# Patient Record
Sex: MALE | Race: WHITE | NOT HISPANIC OR LATINO | Employment: FULL TIME | URBAN - METROPOLITAN AREA
[De-identification: names, ages, dates, MRNs, and addresses within clinical notes are randomized per-mention and may not be internally consistent; named-entity substitution may affect disease eponyms.]

---

## 2017-04-17 ENCOUNTER — HOSPITAL ENCOUNTER (EMERGENCY)
Facility: HOSPITAL | Age: 22
Discharge: HOME | End: 2017-04-18
Attending: EMERGENCY MEDICINE
Payer: COMMERCIAL

## 2017-04-17 DIAGNOSIS — G89.29 CHRONIC RIGHT SHOULDER PAIN: Primary | ICD-10-CM

## 2017-04-17 DIAGNOSIS — G40.909 SEIZURE DISORDER: ICD-10-CM

## 2017-04-17 DIAGNOSIS — M25.511 CHRONIC RIGHT SHOULDER PAIN: Primary | ICD-10-CM

## 2017-04-17 DIAGNOSIS — M25.519 SHOULDER PAIN: ICD-10-CM

## 2017-04-17 PROCEDURE — 99283 EMERGENCY DEPT VISIT LOW MDM: CPT

## 2017-04-17 NOTE — ED AVS SNAPSHOT
[facility] - [address] [address]               Sharif Parker   2017 11:43 PM   ED    Description:  Male : 1995   Department:  [facility]           Your Care was Coordinated By:     Provider Role From To    Letty Rachel MD Attending Provider 17 0519 --      Reason for Visit     Shoulder Problem           Diagnoses this Visit        Comments    Chronic right shoulder pain    -  Primary     Shoulder pain         Seizure disorder           ED Disposition     None           To Do List           Follow-up Information     Follow up with [facility] NEUROLOGY. Schedule an appointment as soon as possible for a visit in 2 days.    Specialty:  Neurology    Contact information:    [address] [place] [address]  [phone]        Follow up with [facility] ORTHOPEDICS. Schedule an appointment as soon as possible for a visit in 2 days.    Specialty:  Orthopedics    Contact information:    [address] [place] [address]  [phone]       These Medications        Disp Refills Start End    naproxen (NAPROSYN) 500 MG tablet 30 tablet 0 2017     Take 1 tablet (500 mg total) by mouth 2 (two) times daily with meals. - Oral      [facility]     [facility] On Call Nurse Care Line -  Assistance  Unless otherwise directed by your provider, please contact [facility], our nurse care line that is available for  assistance.     Registered nurses in the [facility] provide: appointment scheduling, clinical advisement, health education, and other advisory services.  Call: [phone] (toll free)               Medications           Message regarding Medications     Verify the changes and/or additions to your medication regime listed below are the same as discussed with your clinician today.  If any of these changes or additions are incorrect, please notify  "your healthcare provider.        START taking these NEW medications        Refills    naproxen (NAPROSYN) 500 MG tablet 0    Sig: Take 1 tablet (500 mg total) by mouth 2 (two) times daily with meals.    Class: Print    Route: Oral           Verify that the below list of medications is an accurate representation of the medications you are currently taking.  If none reported, the list may be blank. If incorrect, please contact your healthcare provider. Carry this list with you in case of emergency.           Current Medications     naproxen (NAPROSYN) 500 MG tablet Take 1 tablet (500 mg total) by mouth 2 (two) times daily with meals.           Clinical Reference Information           Your Vitals Were     BP Pulse Temp Resp Height Weight    122/73 (BP Location: Left arm, Patient Position: Sitting) 105 98.2 °F (36.8 °C) (Oral) 20 5' 11" (1.803 m) 127 kg (280 lb)    SpO2 BMI             96% 39.05 kg/m2         Allergies as of 4/18/2017     No Known Allergies      Immunizations Administered on Date of Encounter - 4/18/2017     None      ED Micro, Lab, POCT     None      ED Imaging Orders     Start Ordered       Status Ordering Provider    04/17/17 2345 04/17/17 2344  X-Ray Shoulder Trauma Right  1 time imaging      Final result       Discharge References/Attachments     EPILEPSY, SELF-CARE FOR (ENGLISH)    SHOULDER PAIN, UNCERTAIN CAUSE (ENGLISH)    SHOULDER PROBLEMS (ENGLISH)      [facility] Sign-Up     Activating your [facility] account is as easy as 1-2-3!     1) Visit [email or web address], select Sign Up Now, enter this activation code and your date of birth, then select Next.  [email or web address]  Expires: 6/2/2017 12:08 AM      2) Create a username and password to use when you visit [facility] in the future and select a security question in case you lose your password and select Next.    3) Enter your e-mail address and click Sign Up!    Additional Information  If you have questions, please e-mail [email or web address] or call " [phone] to talk to our [facility] staff. Remember, [facility] is NOT to be used for urgent needs. For medical emergencies, dial 911.         Smoking Cessation     If you would like to quit smoking:   You may be eligible for free services if you are a [place] resident and started smoking cigarettes before September 1, 1988.  Call the Smoking Cessation Trust (SCT) toll free at (049) 397-0114 or (692) 544-1913.   Call 1-800-QUIT-NOW if you do not meet the above criteria.   Contact us via email: [email or web address]   View our website for more information: [email or web address]         [facility] -  complies with applicable Federal civil rights laws and does not discriminate on the basis of race, color, national origin, age, disability, or sex.        Language Assistance Services     ATTENTION: Language assistance services are available, free of charge. Please call [phone].      ATENCIÓN: Si habla español, tiene a london disposición servicios gratuitos de asistencia lingüística. Llame al [phone].     CHÚ Ý: N?u b?n nói Ti?ng Vi?t, có các d?ch v? h? tr? ngôn ng? mi?n phí dành cho b?n. G?i s? [phone].

## 2017-04-18 VITALS
DIASTOLIC BLOOD PRESSURE: 75 MMHG | TEMPERATURE: 98 F | HEART RATE: 88 BPM | WEIGHT: 280 LBS | HEIGHT: 71 IN | SYSTOLIC BLOOD PRESSURE: 120 MMHG | RESPIRATION RATE: 18 BRPM | OXYGEN SATURATION: 96 % | BODY MASS INDEX: 39.2 KG/M2

## 2017-04-18 RX ORDER — NAPROXEN 500 MG/1
500 TABLET ORAL 2 TIMES DAILY WITH MEALS
Qty: 30 TABLET | Refills: 0 | Status: SHIPPED | OUTPATIENT
Start: 2017-04-18 | End: 2019-03-29

## 2017-04-18 RX ORDER — LEVETIRACETAM 1000 MG/1
1500 TABLET ORAL 2 TIMES DAILY
COMMUNITY
End: 2019-03-29 | Stop reason: SDUPTHER

## 2017-04-18 NOTE — ED PROVIDER NOTES
SCRIBE #1 NOTE: I, Matt Duckworth/Bridget Rivera, am scribing for, and in the presence of, Letty Rachel MD. I have scribed the entire note.      History      Chief Complaint   Patient presents with    Shoulder Problem     Possible dislocation of right shoulder after witnessed seizure. Hx has epilectic seizures       Review of patient's allergies indicates:  No Known Allergies     HPI   HPI    4/17/2017, 11:59 PM   History obtained from the patient and girlfriend      History of Present Illness: Sharif Parker is a 21 y.o. male patient with a hx of seizures who presents to the Emergency Department for R shoulder pain which onset suddenly tonight after patient had a seizure while lying in bed. Girlfriend states she heard a popping sound from shoulder during the seizure. No fall or trauma. Patient reports his last seizure was in November 2016. Pain is are constant and moderate in severity. Pain is worse with ROM. No other mitigating or exacerbating factors reported. Patient is on 1500 mg Keppra BID, although pt does not remember taking his Keppra today. Patient denies HA, n/v/d, CP, SOB, head trauma, LOC, urinary and fecal incontinence, and all other sxs at this time. No prior Tx reported. No further complaints or concerns at this time.         Arrival mode: Personal vehicle    PCP: Giancarlo Marin MD       Past Medical History:  Past Medical History:   Diagnosis Date    Seizures        Past Surgical History:  History reviewed. No pertinent surgical history.      Family History:  History reviewed. No pertinent family history.    Social History:  Social History     Social History Main Topics    Smoking status: Never Smoker    Smokeless tobacco: Not on file    Alcohol use No    Drug use: No    Sexual activity: Not on file       ROS   Review of Systems   Constitutional: Negative for chills and fever.   HENT: Negative for sore throat and trouble swallowing.         (+) Tongue and lip biting.   Respiratory:  Negative for cough and shortness of breath.    Cardiovascular: Negative for chest pain.   Gastrointestinal: Negative for abdominal pain, diarrhea, nausea and vomiting.   Genitourinary: Negative for dysuria and hematuria.   Musculoskeletal: Negative for back pain.        (+) R shoulder pain   Skin: Negative for rash and wound.   Neurological: Positive for seizures. Negative for dizziness, weakness, numbness and headaches.   Hematological: Does not bruise/bleed easily.   All other systems reviewed and are negative.      Physical Exam    Initial Vitals   BP Pulse Resp Temp SpO2   04/17/17 2340 04/17/17 2340 04/17/17 2340 04/17/17 2340 04/17/17 2340   122/73 105 20 98.2 °F (36.8 °C) 96 %      Physical Exam  Nursing Notes and Vital Signs Reviewed.  Constitutional: Patient is in no acute distress. Awake and alert. Well-developed and well-nourished.  Head: Atraumatic. Normocephalic.  Eyes: PERRL. EOM intact. Conjunctivae are not pale. No scleral icterus.  ENT: Mucous membranes are moist. Oropharynx is clear and symmetric. Positive abrasions to the lip and tongue.    Neck: Supple. Full ROM. No lymphadenopathy.  Cardiovascular: Regular rate. Regular rhythm. No murmurs, rubs, or gallops. Distal pulses are 2+ and symmetric.  Pulmonary/Chest: No respiratory distress. Clear to auscultation bilaterally. No wheezing, rales, or rhonchi.  Abdominal: Soft and non-distended.  There is no tenderness.  No rebound, guarding, or rigidity. Good bowel sounds.  Musculoskeletal: Moves all extremities.  No obvious deformities. No edema. No calf tenderness.  RUE: has no evident deformity. negative for swelling. Positive for tenderness. Pain with elevation of arm. Cap refill distally is <2 seconds. Radial pulses are equal and 2+ bilaterally. No motor deficit. No distal sensory deficit.  Skin: Warm and dry.  Neurological:  Alert, awake, and appropriate.  Normal speech. CN grossly intact. No acute focal neurological deficits are  "appreciated.  Psychiatric: Normal affect. Good eye contact. Appropriate in content.       ED Course    Procedures  ED Vital Signs:  Vitals:    04/17/17 2340   BP: 122/73   Pulse: 105   Resp: 20   Temp: 98.2 °F (36.8 °C)   TempSrc: Oral   SpO2: 96%   Weight: 127 kg (280 lb)   Height: 5' 11" (1.803 m)              The Emergency Provider reviewed the vital signs and test results, which are outlined above.    ED Discussion       12:15 AM: Discussed with pt all pertinent ED information and results. Discussed pt dx and plan of tx. Gave pt all f/u and return to the ED instructions. All questions and concerns were addressed at this time. Pt expresses understanding of information and instructions, and is comfortable with plan to discharge. Pt is stable for discharge.        Patient presents with seizure or seizure-like symptoms. I have no suspicion of an intracranial, traumatic, infectious, metabolic, toxic, cardiovascular (specifically arrhythmia), or other emergent medical condition requiring further intervention. Seizure precautions were discussed with the patient and/or caretaker, specifically not to swim unattended, not to operate motor vehicles or other machinery, and to avoid heights or other areas where falls may occur until cleared by primary care physician. Patient is safe for discharge.      ED Medication(s):  Medications - No data to display    New Prescriptions    NAPROXEN (NAPROSYN) 500 MG TABLET    Take 1 tablet (500 mg total) by mouth 2 (two) times daily with meals.       Follow-up Information     Follow up with [facility] NEUROLOGY. Schedule an appointment as soon as possible for a visit in 2 days.    Specialty:  Neurology    Contact information:    [address] [place] [id number] [phone]        Follow up with [facility] ORTHOPEDICS. Schedule an appointment as soon as possible for a visit in 2 days.    Specialty:  Orthopedics    Contact information:    [address] [place] [address] " [facility] [address]  [phone]            Medical Decision Making    Medical Decision Making:   History:   Old Medical Records: I decided to obtain old medical records.  Clinical Tests:   Radiological Study: Ordered and Reviewed           Scribe Attestation:   Scribe #1: I performed the above scribed service and the documentation accurately describes the services I performed. I attest to the accuracy of the note.    Attending:   Physician Attestation Statement for Scribe #1: I, Letty Rachel MD, personally performed the services described in this documentation, as scribed by Matt Duckworth/Bridget Rivera, in my presence, and it is both accurate and complete.          Clinical Impression       ICD-10-CM ICD-9-CM   1. Chronic right shoulder pain M25.511 719.41    G89.29 338.29   2. Shoulder pain M25.519 719.41   3. Seizure disorder G40.909 345.90       Disposition:   Disposition: Discharged  Condition: Stable         Letty Rachel MD  04/18/17 0032

## 2019-03-29 ENCOUNTER — HOSPITAL ENCOUNTER (EMERGENCY)
Facility: HOSPITAL | Age: 24
Discharge: HOME | End: 2019-03-29
Attending: EMERGENCY MEDICINE
Payer: COMMERCIAL

## 2019-03-29 VITALS
BODY MASS INDEX: 37.48 KG/M2 | RESPIRATION RATE: 16 BRPM | OXYGEN SATURATION: 98 % | DIASTOLIC BLOOD PRESSURE: 64 MMHG | HEART RATE: 77 BPM | TEMPERATURE: 98 F | SYSTOLIC BLOOD PRESSURE: 119 MMHG | WEIGHT: 261.81 LBS | HEIGHT: 70 IN

## 2019-03-29 DIAGNOSIS — G40.909 SEIZURE DISORDER: Primary | ICD-10-CM

## 2019-03-29 PROCEDURE — 99284 EMERGENCY DEPT VISIT MOD MDM: CPT

## 2019-03-29 PROCEDURE — 25000003 PHARM REV CODE 250: Performed by: EMERGENCY MEDICINE

## 2019-03-29 RX ORDER — LEVETIRACETAM 750 MG/1
1500 TABLET ORAL 2 TIMES DAILY
Qty: 180 TABLET | Refills: 3 | Status: SHIPPED | OUTPATIENT
Start: 2019-03-29

## 2019-03-29 RX ORDER — LEVETIRACETAM 500 MG/1
1500 TABLET ORAL
Status: COMPLETED | OUTPATIENT
Start: 2019-03-29 | End: 2019-03-29

## 2019-03-29 RX ADMIN — LEVETIRACETAM 1500 MG: 500 TABLET ORAL at 02:03

## 2019-03-29 NOTE — ED NOTES
Pt reports he has been out of his seizure meds for about a week and thinks he had a seizure while he was sleeping. Pt currently aaox4, in no acute distress with c/o muscle pain.

## 2019-03-29 NOTE — ED PROVIDER NOTES
SCRIBE #1 NOTE: I, Nicole Torres, am scribing for, and in the presence of, April Perez MD. I have scribed the entire note.         History     Chief Complaint   Patient presents with    Seizures     pt reports leg stiffness, weakness; out of seizure meds for a week       Review of patient's allergies indicates:  No Known Allergies      History of Present Illness   HPI    3/29/2019, 1:48 AM  History obtained from the patient      History of Present Illness: Sharif Parker is a 23 y.o. male patient with PMHx of seizures who presents to the Emergency Department for seizure which onset gradually a few hours ago while sleeping. Patient states he bit his tongue during episode. Symptoms are episodic and moderate in severity. No mitigating or exacerbating factors reported. No other sxs reported. Patient denies any fever, chills, HA, dizziness, head injury, bowel/bladder incontinence, N/V, extremity weakness/numbness, and all other sxs at this time. Patient states he has been out of his 1500mg bid Keppra and 400mg qd Aptiom for a week. No further complaints or concerns at this time.     Arrival mode: Personal vehicle      PCP: Giancarlo Marin MD        Past Medical History:  Past Medical History:   Diagnosis Date    Seizures        Past Surgical History:  History reviewed. No pertinent surgical history.      Family History:  History reviewed. No pertinent family history.    Social History:  Social History     Tobacco Use    Smoking status: Never Smoker   Substance and Sexual Activity    Alcohol use: No     Comment: occasionally    Drug use: No    Sexual activity: Yes        Review of Systems   Review of Systems   Constitutional: Negative for chills and fever.   HENT: Negative for sore throat.         (+) tongue biting   Respiratory: Negative for shortness of breath.    Cardiovascular: Negative for chest pain.   Gastrointestinal: Negative for nausea and vomiting.   Genitourinary: Negative for dysuria.   Musculoskeletal:  "Negative for back pain.   Skin: Negative for rash.   Neurological: Positive for seizures. Negative for dizziness, weakness, numbness and headaches.        (-) bowel/bladder incontinence   Hematological: Does not bruise/bleed easily.   All other systems reviewed and are negative.       Physical Exam     Initial Vitals [03/29/19 0026]   BP Pulse Resp Temp SpO2   (!) 175/81 106 20 98.2 °F (36.8 °C) 95 %      MAP       --          Physical Exam  Nursing Notes and Vital Signs Reviewed.  Constitutional: Patient is in no acute distress. Well-developed and well-nourished.  Head: Atraumatic. Normocephalic.  Eyes: PERRL. EOM intact. Conjunctivae are not pale. No scleral icterus.  ENT: Mucous membranes are moist. Oropharynx is clear and symmetric. Abrasion to R lateral tongue.  Neck: Supple. Full ROM. No lymphadenopathy.  Cardiovascular: Regular rate. Regular rhythm. No murmurs, rubs, or gallops. Distal pulses are 2+ and symmetric.  Pulmonary/Chest: No respiratory distress. Clear to auscultation bilaterally. No wheezing or rales.  Abdominal: Soft and non-distended.  There is no tenderness.  No rebound, guarding, or rigidity. Good bowel sounds.  Genitourinary: No CVA tenderness  Musculoskeletal: Moves all extremities. No obvious deformities. No edema. No calf tenderness.  Skin: Warm and dry.  Neurological:  Alert, awake, and appropriate.  Normal speech.  No acute focal neurological deficits are appreciated.  Psychiatric: Normal affect. Good eye contact. Appropriate in content.       ED Course   Procedures  ED Vital Signs:  Vitals:    03/29/19 0026 03/29/19 0235 03/29/19 0304   BP: (!) 175/81 124/67 119/64   Pulse: 106 86 77   Resp: 20 16 16   Temp: 98.2 °F (36.8 °C) 98 °F (36.7 °C) 98 °F (36.7 °C)   TempSrc: Oral Oral Oral   SpO2: 95% 99% 98%   Weight: 118.8 kg (261 lb 12.7 oz)     Height: 5' 10" (1.778 m)                   The Emergency Provider reviewed the vital signs and test results, which are outlined above.     ED " Discussion     2:53 AM: Reassessed pt at this time. Family at bedside. Discussed with pt all pertinent ED information. Discussed pt dx and plan of tx. Gave pt all f/u and return to the ED instructions. All questions and concerns were addressed at this time. Pt expresses understanding of information and instructions, and is comfortable with plan to discharge. Pt is stable for discharge.    Patient presents with seizure or seizure-like symptoms. I have no suspicion of an intracranial, traumatic, infectious, metabolic, toxic, cardiovascular (specifically arrhythmia), or other emergent medical condition requiring further intervention. Seizure precautions were discussed with the patient and/or caretaker, specifically not to swim unattended, not to operate motor vehicles or other machinery, and to avoid heights or other areas where falls may occur until cleared by primary care physician. Patient is safe for discharge.          ED Medication(s):  Medications   levETIRAcetam tablet 1,500 mg (1,500 mg Oral Given 3/29/19 0207)     Current Discharge Medication List   eslicarbazepine (APTIOM) 400 mg Tab 90 tablet 3 3/29/2019  No   Sig - Route: Take 1 tablet (400 mg total) by mouth once daily. - Oral     levETIRAcetam (KEPPRA) 750 MG Tab 180 tablet 3 3/29/2019  No   Sig - Route: Take 2 tablets (1,500 mg total) by mouth 2 (two) times daily. - Oral            Follow-up Information     Giancarlo Marin MD In 3 days.    Specialty:  Internal Medicine  Contact information:  [address] [address] [facility] [address]  [facility] PED[facility]ATR[facility] [address] [phone]             [facility] .    Specialty:  Emergency Medicine  Why:  As needed, If symptoms worsen  Contact information:  [address] [facility] [address] [phone]                      Medical Decision Making                 Scribe Attestation:   Scribe #1: I performed the above scribed service and the  documentation accurately describes the services I performed. I attest to the accuracy of the note.     Attending:   Physician Attestation Statement for Scribe #1: I, April Perez MD, personally performed the services described in this documentation, as scribed by Nicole Torres, in my presence, and it is both accurate and complete.           Clinical Impression       ICD-10-CM ICD-9-CM   1. Seizure disorder G40.909 345.90       Disposition:   Disposition: Discharged  Condition: Stable         April Perez MD  03/29/19 0583

## 2024-01-07 ENCOUNTER — OFFICE VISIT (OUTPATIENT)
Dept: URGENT CARE | Facility: CLINIC | Age: 29
End: 2024-01-07
Payer: COMMERCIAL

## 2024-01-07 VITALS
OXYGEN SATURATION: 96 % | BODY MASS INDEX: 37.37 KG/M2 | WEIGHT: 261 LBS | SYSTOLIC BLOOD PRESSURE: 145 MMHG | HEIGHT: 70 IN | RESPIRATION RATE: 18 BRPM | DIASTOLIC BLOOD PRESSURE: 98 MMHG | HEART RATE: 97 BPM | TEMPERATURE: 98 F

## 2024-01-07 DIAGNOSIS — R07.0 THROAT DISCOMFORT: ICD-10-CM

## 2024-01-07 DIAGNOSIS — J06.9 VIRAL URI WITH COUGH: ICD-10-CM

## 2024-01-07 LAB
CTP QC/QA: YES
CTP QC/QA: YES
POC MOLECULAR INFLUENZA A AGN: NEGATIVE
POC MOLECULAR INFLUENZA B AGN: NEGATIVE
SARS-COV-2 AG RESP QL IA.RAPID: NEGATIVE

## 2024-01-07 PROCEDURE — 87502 INFLUENZA DNA AMP PROBE: CPT | Mod: QW,S$GLB,,

## 2024-01-07 PROCEDURE — 87811 SARS-COV-2 COVID19 W/OPTIC: CPT | Mod: QW,S$GLB,,

## 2024-01-07 PROCEDURE — 99203 OFFICE O/P NEW LOW 30 MIN: CPT | Mod: S$GLB,,,

## 2024-01-07 RX ORDER — PREDNISONE 20 MG/1
20 TABLET ORAL DAILY
Qty: 5 TABLET | Refills: 0 | Status: SHIPPED | OUTPATIENT
Start: 2024-01-07 | End: 2024-01-12

## 2024-01-07 RX ORDER — PROMETHAZINE HYDROCHLORIDE AND DEXTROMETHORPHAN HYDROBROMIDE 6.25; 15 MG/5ML; MG/5ML
5 SYRUP ORAL EVERY 6 HOURS PRN
Qty: 118 ML | Refills: 0 | Status: SHIPPED | OUTPATIENT
Start: 2024-01-07 | End: 2024-01-17

## 2024-01-07 NOTE — PATIENT INSTRUCTIONS
Reviewed negative COVID-19 virus and flu test with patient who verbalized understanding.  Advised patient that symptoms are indicative of an upper respiratory infection which is viral in nature and should be treated symptomatically.  We discussed over-the-counter medications as well as home remedies to help with current symptoms.  We also discussed a wait and see antibiotic plan which the patient verbalized understanding.  Patient educational handouts also included in discharge paperwork for patient who verbalized understanding agrees with plan of care.  They deny any further questions or concerns at this time.  Patient exits exam room in no acute distress.      PLEASE READ YOUR DISCHARGE INSTRUCTIONS ENTIRELY AS IT CONTAINS IMPORTANT INFORMATION.      - Please drink plenty of fluids.  - Please get plenty of rest.  - You can take plain Mucinex (guaifenesin) 1200 mg twice a day to help loosen mucous.   - Use over the counter Flonase as directed  Please return here or go to the Emergency Department for any concerns or worsening of condition.  - Please take an over the counter antihistamine medication (Allegra/Claritin/Zyrtec/Xyzal) of your choice as directed. These are antihistamines that can help with runny nose, nasal congestion, sneezing, and helps to dry up post-nasal drip, which usually causes sore throat and cough.    -If you do NOT have high blood pressure, you may use a decongestant form (D)  of this medication (ie. Claritin- D, zyrtec-D, allegra-D, Mucinex-D) or if you do not take the D form, you can take sudafed (pseudoephedrine) over the counter, which is a decongestant. Do NOT take two decongestant (D) medications at the same time (such as mucinex-D and claritin-D or plain sudafed and claritin D). Dextromethorphan (DM) is a cough suppressant over the counter (ie. mucinex DM, robitussin, delsym; dayquil/nyquil has DM as well.)    If you do have Hypertension or palpitations, it is safe to take Coricidin HBP  for relief of sinus symptoms.    - If not allergic, please take over the counter Tylenol (Acetaminophen) and/or Motrin (Ibuprofen) as directed for control of pain and/or fever.  Avoid tylenol if you have a history of liver disease. Do not take ibuprofen if you have a history of GI bleeding, kidney disease, or if you take blood thinners.  Please follow up with your primary care doctor or specialist as needed.    -IF YOU RECEIVED PRESCRIPTION COUGH SUPPRESSANTS: Take prescription cough meds (pills) as prescribed; take prescription cough syrup at night as needed for cough.  Do not take both the prescribed cough pills and syrup at the same time or within 6 hours of each other.  Do not take the cough syrup with any other sedative medication as it can can cause drowsiness. Do not operate any heavy machinery, drink or drive while taking the cough syrup.    Try taking half a dose first of the cough syrup to see how it affects you.     Sore throat recommendations: Warm fluids, warm salt water gargles, throat lozenges, tea, honey, soup, rest, hydration.    Use over the counter flonase: one spray each nostril twice daily OR two sprays each nostril once daily for sinus congestion and postnasal drip. This is a steroid nasal spray that works locally over time to decrease the inflammation in your nose/sinuses and help with allergic symptoms. This is not an quick- relief spray like afrin, but it works well if used daily.  Discontinue if you develop nose bleed    Sinus rinses DO NOT USE TAP WATER, if you must, water must be a rolling boil for 1 minute, let it cool, then use.  May use distilled water, or over the counter nasal saline rinses.  Vics vapor rub in shower to help open nasal passages.  May use nasal gel to keep passages moisturized.  May use Nasal saline sprays during the day for added relief of congestion.   For those who go to the gym, please do not use the sauna or steam room now to clear sinuses.    If you  smoke,  please stop smoking.      Please return or see your primary care doctor if you develop new or worsening symptoms.     Please arrange follow up with your primary medical clinic as soon as possible. You must understand that you've received an Urgent Care treatment only and that you may be released before all of your medical problems are known or treated. You, the patient, will arrange for follow up as instructed. If your symptoms worsen or fail to improve you should go to the Emergency Room.    You received a steroid prescription/shot today - Please be aware of potential side effects of steroids including elevating blood pressure, increased blood glucose levels, redness to the face, increased risk of opportunistic infections, peptic ulcer disease and GI bleeding, insomnia, tremors. If you received a shot you may also notice dimpling of the skin where the shot goes in.   Do not use steroids more than 3 times per year.   If you have diabetes, please check you blood sugar frequently.  If you have high blood pressure, please check your blood pressure frequently.     ORAL STEROIDS   A short course of prednisone or methylprednisolone will almost certainly make you feel better. Steroids boast your energy level, alleviate pain and nausea, block allergies, reduce swelling, shrink nasal polyps, alleviate asthma, and can even restore hearing in some patients with sudden deafness. However, steroids must be used with caution, because they can have significant addictive potential and cause serious side effects - especially with long-term use. For this reason, oral or systemic steroids are reserved for the most urgent uses, and TOPICAL or LOCAL steroids are preferred.     RISKS OF SYSTEMIC STEROIDS     Steroids are the most effective anti-inflammatory drugs available, and are derivatives of natural hormones which the body creates to help the body cope with injury or stress.  However, prolonged use of oral or systemic steroids can  result in suppression of normal steroid levels in the body.  Therefore, these medications should be taken exactly as prescribed, usually in a gradually decreasing dose, to avoid sudden withdrawal.  Withdrawal symptoms are uncommon in patients who have used steroids for less than two weeks at a time.  Continued or repeated use of steroids can reduce your ability to fight infection and can result in weight gain, fluid retention, acne, increased body hair, purple marks on the abdomen, collection of fatty deposits under the skin, and easy bruising.  High doses of steroids will frequently cause nervousness, sleeplessness, excitation, and sometimes depression or confusion.  Steroids can also cause elevation of blood sugar or blood pressure or change in salt balance.  Prolonged steroids can cause thinning of the bones, muscle weakness, glaucoma, and cataracts.  They can aggravate ulcers.  Patients who are pregnant, have a history of stomach ulcers, glaucoma, diabetes, high blood pressure, tuberculosis, osteoporosis, or recent vaccination, should not take steroids unless absolutely necessary.  A very rare complication of steroids is interruption of the blood supply to the hip bone which can result in a fracture that requires a hip replacement.   Fortunately, all of these complications are extremely rare in patients treated with short-term doses of steroids.  If your doctor has prescribed systemic steroids, he or she has likely judged that the risk of these complications is outweighed by the potential benefit for the treatment of your disease.  If you have any questions about this information or the instructions on how to take your steroids, please speak with your doctor before you begin the medication.   Alternatives to systemic steroids include topical applications to the nose, skin, lung or ear, so that the systemic dose - that which distributes through the body - is greatly reduced. Topical steroids greatly reduce the  risk of prolonged use of steroids.

## 2024-01-07 NOTE — PROGRESS NOTES
"Subjective:      Patient ID: Sharif Parker is a 28 y.o. male.    Vitals:  height is 5' 10" (1.778 m) and weight is 118.4 kg (261 lb). His oral temperature is 98.2 °F (36.8 °C). His blood pressure is 145/98 (abnormal) and his pulse is 97. His respiration is 18 and oxygen saturation is 96%.     Chief Complaint: Cough    29 yo male Patient presents to clinic with dry cough that worsened yesterday and causing him to have an irritated sore throat and chest muscle aches onset 2 days. "I had to sleep on the couch last night because I could not stop coughing and my wife could not get any sleep." No recorded fever. He was given tesslon pearls right before dru and has been taking them again and they are not helping his cough this time. He has noticed wheezing when he lays down the fast few days.  "During the day, I am better, but at night everything worsens." Denies hx of asthma, copd, bronchitis, pneumonia. NKDA.     Cough  This is a new problem. The current episode started in the past 7 days. The cough is Productive of sputum. Associated symptoms include chest pain (muscular from "coughing spells last night") and a sore throat ("feels tight and coughing makes it worse"). Pertinent negatives include no chills, ear pain, eye redness, fever, rash or shortness of breath. The symptoms are aggravated by lying down. There is no history of asthma.       Constitution: Negative for chills and fever.   HENT:  Positive for sore throat ("feels tight and coughing makes it worse"). Negative for ear pain, ear discharge, trouble swallowing and voice change.    Neck: Negative for neck pain and neck stiffness.   Cardiovascular:  Positive for chest pain (muscular from "coughing spells last night").   Eyes:  Negative for eye discharge, eye itching and eye redness.   Respiratory:  Positive for cough. Negative for sputum production, COPD, shortness of breath, stridor and asthma.    Musculoskeletal:  Negative for back pain.   Skin:  Negative " for rash.   Allergic/Immunologic: Negative for asthma and sneezing.      Objective:     Vitals:    01/07/24 1256   BP: (!) 145/98   Pulse: 97   Resp: 18   Temp: 98.2 °F (36.8 °C)       Physical Exam   Constitutional: He is oriented to person, place, and time. He appears well-developed. He is cooperative.  Non-toxic appearance. He does not appear ill. No distress.   HENT:   Head: Normocephalic and atraumatic.   Ears:   Right Ear: Hearing, tympanic membrane, external ear and ear canal normal. No cerumen not present. Tympanic membrane is not erythematous and not bulging. impacted cerumen  Left Ear: Hearing, tympanic membrane, external ear and ear canal normal. No cerumen not present. Tympanic membrane is not erythematous and not bulging. impacted cerumen  Nose: Nose normal. No mucosal edema, rhinorrhea or nasal deformity. No epistaxis. Right sinus exhibits no maxillary sinus tenderness and no frontal sinus tenderness. Left sinus exhibits no maxillary sinus tenderness and no frontal sinus tenderness.   Mouth/Throat: Uvula is midline, oropharynx is clear and moist and mucous membranes are normal. Mucous membranes are moist. No trismus in the jaw. Normal dentition. No uvula swelling. No oropharyngeal exudate, posterior oropharyngeal edema, posterior oropharyngeal erythema, tonsillar abscesses or cobblestoning.   Eyes: Conjunctivae and lids are normal. Pupils are equal, round, and reactive to light. Right eye exhibits no discharge. Left eye exhibits no discharge. No scleral icterus.   Neck: Trachea normal and phonation normal. Neck supple. No edema present. No erythema present. No neck rigidity present.   Cardiovascular: Normal rate, regular rhythm, normal heart sounds and normal pulses.   Pulmonary/Chest: Effort normal and breath sounds normal. No accessory muscle usage. No respiratory distress. He has no decreased breath sounds. He has no wheezes. He has no rhonchi. He has no rales.   Abdominal: Normal appearance.    Musculoskeletal: Normal range of motion.         General: No deformity. Normal range of motion.   Neurological: He is alert and oriented to person, place, and time. He displays no weakness. He exhibits normal muscle tone. Coordination and gait normal.   Skin: Skin is warm, dry, intact, not diaphoretic, not pale and no rash.   Psychiatric: His speech is normal and behavior is normal. Judgment and thought content normal.   Nursing note and vitals reviewed.      Assessment:     1. Viral URI with cough    2. Throat discomfort      Results for orders placed or performed in visit on 01/07/24   POCT Influenza A/B MOLECULAR   Result Value Ref Range    POC Molecular Influenza A Ag Negative Negative, Not Reported    POC Molecular Influenza B Ag Negative Negative, Not Reported     Acceptable Yes    SARS Coronavirus 2 Antigen, POCT Manual Read   Result Value Ref Range    SARS Coronavirus 2 Antigen Negative Negative     Acceptable Yes        Plan:       Viral URI with cough  -     predniSONE (DELTASONE) 20 MG tablet; Take 1 tablet (20 mg total) by mouth once daily. for 5 days  Dispense: 5 tablet; Refill: 0  -     promethazine-dextromethorphan (PROMETHAZINE-DM) 6.25-15 mg/5 mL Syrp; Take 5 mLs by mouth every 6 (six) hours as needed (cough).  Dispense: 118 mL; Refill: 0  -     POCT Influenza A/B MOLECULAR  -     SARS Coronavirus 2 Antigen, POCT Manual Read    Throat discomfort  -     predniSONE (DELTASONE) 20 MG tablet; Take 1 tablet (20 mg total) by mouth once daily. for 5 days  Dispense: 5 tablet; Refill: 0        Patient Instructions   Reviewed negative COVID-19 virus and flu test with patient who verbalized understanding.  Advised patient that symptoms are indicative of an upper respiratory infection which is viral in nature and should be treated symptomatically.  We discussed over-the-counter medications as well as home remedies to help with current symptoms.  We also discussed a wait and see  antibiotic plan which the patient verbalized understanding.  Patient educational handouts also included in discharge paperwork for patient who verbalized understanding agrees with plan of care.  They deny any further questions or concerns at this time.  Patient exits exam room in no acute distress.      PLEASE READ YOUR DISCHARGE INSTRUCTIONS ENTIRELY AS IT CONTAINS IMPORTANT INFORMATION.      - Please drink plenty of fluids.  - Please get plenty of rest.  - You can take plain Mucinex (guaifenesin) 1200 mg twice a day to help loosen mucous.   - Use over the counter Flonase as directed  Please return here or go to the Emergency Department for any concerns or worsening of condition.  - Please take an over the counter antihistamine medication (Allegra/Claritin/Zyrtec/Xyzal) of your choice as directed. These are antihistamines that can help with runny nose, nasal congestion, sneezing, and helps to dry up post-nasal drip, which usually causes sore throat and cough.    -If you do NOT have high blood pressure, you may use a decongestant form (D)  of this medication (ie. Claritin- D, zyrtec-D, allegra-D, Mucinex-D) or if you do not take the D form, you can take sudafed (pseudoephedrine) over the counter, which is a decongestant. Do NOT take two decongestant (D) medications at the same time (such as mucinex-D and claritin-D or plain sudafed and claritin D). Dextromethorphan (DM) is a cough suppressant over the counter (ie. mucinex DM, robitussin, delsym; dayquil/nyquil has DM as well.)    If you do have Hypertension or palpitations, it is safe to take Coricidin HBP for relief of sinus symptoms.    - If not allergic, please take over the counter Tylenol (Acetaminophen) and/or Motrin (Ibuprofen) as directed for control of pain and/or fever.  Avoid tylenol if you have a history of liver disease. Do not take ibuprofen if you have a history of GI bleeding, kidney disease, or if you take blood thinners.  Please follow up with your  primary care doctor or specialist as needed.    -IF YOU RECEIVED PRESCRIPTION COUGH SUPPRESSANTS: Take prescription cough meds (pills) as prescribed; take prescription cough syrup at night as needed for cough.  Do not take both the prescribed cough pills and syrup at the same time or within 6 hours of each other.  Do not take the cough syrup with any other sedative medication as it can can cause drowsiness. Do not operate any heavy machinery, drink or drive while taking the cough syrup.    Try taking half a dose first of the cough syrup to see how it affects you.     Sore throat recommendations: Warm fluids, warm salt water gargles, throat lozenges, tea, honey, soup, rest, hydration.    Use over the counter flonase: one spray each nostril twice daily OR two sprays each nostril once daily for sinus congestion and postnasal drip. This is a steroid nasal spray that works locally over time to decrease the inflammation in your nose/sinuses and help with allergic symptoms. This is not an quick- relief spray like afrin, but it works well if used daily.  Discontinue if you develop nose bleed    Sinus rinses DO NOT USE TAP WATER, if you must, water must be a rolling boil for 1 minute, let it cool, then use.  May use distilled water, or over the counter nasal saline rinses.  Vics vapor rub in shower to help open nasal passages.  May use nasal gel to keep passages moisturized.  May use Nasal saline sprays during the day for added relief of congestion.   For those who go to the gym, please do not use the sauna or steam room now to clear sinuses.    If you  smoke, please stop smoking.      Please return or see your primary care doctor if you develop new or worsening symptoms.     Please arrange follow up with your primary medical clinic as soon as possible. You must understand that you've received an Urgent Care treatment only and that you may be released before all of your medical problems are known or treated. You, the  patient, will arrange for follow up as instructed. If your symptoms worsen or fail to improve you should go to the Emergency Room.    You received a steroid prescription/shot today - Please be aware of potential side effects of steroids including elevating blood pressure, increased blood glucose levels, redness to the face, increased risk of opportunistic infections, peptic ulcer disease and GI bleeding, insomnia, tremors. If you received a shot you may also notice dimpling of the skin where the shot goes in.   Do not use steroids more than 3 times per year.   If you have diabetes, please check you blood sugar frequently.  If you have high blood pressure, please check your blood pressure frequently.     ORAL STEROIDS   A short course of prednisone or methylprednisolone will almost certainly make you feel better. Steroids boast your energy level, alleviate pain and nausea, block allergies, reduce swelling, shrink nasal polyps, alleviate asthma, and can even restore hearing in some patients with sudden deafness. However, steroids must be used with caution, because they can have significant addictive potential and cause serious side effects - especially with long-term use. For this reason, oral or systemic steroids are reserved for the most urgent uses, and TOPICAL or LOCAL steroids are preferred.     RISKS OF SYSTEMIC STEROIDS     Steroids are the most effective anti-inflammatory drugs available, and are derivatives of natural hormones which the body creates to help the body cope with injury or stress.  However, prolonged use of oral or systemic steroids can result in suppression of normal steroid levels in the body.  Therefore, these medications should be taken exactly as prescribed, usually in a gradually decreasing dose, to avoid sudden withdrawal.  Withdrawal symptoms are uncommon in patients who have used steroids for less than two weeks at a time.  Continued or repeated use of steroids can reduce your ability to  fight infection and can result in weight gain, fluid retention, acne, increased body hair, purple marks on the abdomen, collection of fatty deposits under the skin, and easy bruising.  High doses of steroids will frequently cause nervousness, sleeplessness, excitation, and sometimes depression or confusion.  Steroids can also cause elevation of blood sugar or blood pressure or change in salt balance.  Prolonged steroids can cause thinning of the bones, muscle weakness, glaucoma, and cataracts.  They can aggravate ulcers.  Patients who are pregnant, have a history of stomach ulcers, glaucoma, diabetes, high blood pressure, tuberculosis, osteoporosis, or recent vaccination, should not take steroids unless absolutely necessary.  A very rare complication of steroids is interruption of the blood supply to the hip bone which can result in a fracture that requires a hip replacement.   Fortunately, all of these complications are extremely rare in patients treated with short-term doses of steroids.  If your doctor has prescribed systemic steroids, he or she has likely judged that the risk of these complications is outweighed by the potential benefit for the treatment of your disease.  If you have any questions about this information or the instructions on how to take your steroids, please speak with your doctor before you begin the medication.   Alternatives to systemic steroids include topical applications to the nose, skin, lung or ear, so that the systemic dose - that which distributes through the body - is greatly reduced. Topical steroids greatly reduce the risk of prolonged use of steroids.       Medical Decision Making:   History:   Old Medical Records: I decided to obtain old medical records.  Clinical Tests:   Lab Tests: Ordered and Reviewed       <> Summary of Lab: COVID/flu negative  Urgent Care Management:  Discussed lab results with patient. Cough medication prescribed. Oral steroids prescribed but recommended  to use starting in 72 hours if cough syrup ineffective. Risks/benefits of medications were explained including to monitor blood pressure. Patient agreeable to plan. OTC medications were suggested for any new symptoms that may arise regarding a URI. ER precautions given for new/worsening symptoms or potential side effects of medications including heart palpitations, worsening cp, sob, trouble swallowing liquids or uncontrolled drooling from a potential bacterial infection. PCP follow up recommended for recurrent symptoms and annual check ups. No further questions or concerns. Patient walks out unassisted from clinic in NAD, VSS, afebrile.     Additional MDM:     Heart Failure Score:   COPD = No

## 2024-01-22 ENCOUNTER — OFFICE VISIT (OUTPATIENT)
Dept: SURGERY | Facility: CLINIC | Age: 29
End: 2024-01-22
Payer: COMMERCIAL

## 2024-01-22 VITALS — SYSTOLIC BLOOD PRESSURE: 125 MMHG | DIASTOLIC BLOOD PRESSURE: 84 MMHG

## 2024-01-22 DIAGNOSIS — R10.9 ABDOMINAL PAIN, UNSPECIFIED ABDOMINAL LOCATION: Primary | ICD-10-CM

## 2024-01-22 PROCEDURE — 99205 OFFICE O/P NEW HI 60 MIN: CPT | Mod: S$GLB,,, | Performed by: COLON & RECTAL SURGERY

## 2024-01-22 PROCEDURE — 3074F SYST BP LT 130 MM HG: CPT | Mod: CPTII,S$GLB,, | Performed by: COLON & RECTAL SURGERY

## 2024-01-22 PROCEDURE — 1159F MED LIST DOCD IN RCRD: CPT | Mod: CPTII,S$GLB,, | Performed by: COLON & RECTAL SURGERY

## 2024-01-22 PROCEDURE — 99999 PR PBB SHADOW E&M-EST. PATIENT-LVL II: CPT | Mod: PBBFAC,,, | Performed by: COLON & RECTAL SURGERY

## 2024-01-22 PROCEDURE — 3079F DIAST BP 80-89 MM HG: CPT | Mod: CPTII,S$GLB,, | Performed by: COLON & RECTAL SURGERY

## 2024-01-24 NOTE — PROGRESS NOTES
History & Physical    SUBJECTIVE:     CC: abdominal pain     History of Present Illness:  Patient is a 28 y.o. male presents with abdominal pain onset of symptoms October 2023.  Patient describes left lower quadrant abdominal pain that radiates to right lower quadrant.  Presented to [facility] in October and again in November and was found to have elevated lipase during initial workup concerning for pancreatitis, but normal levels since.  He received several abdominal CTs, abdominal ultrasound, a colonoscopy and EGD that have all been unrevealing as the etiology of the pain.  Negative stool samples.  Colonoscopy with biopsy negative; some diverticulosis.  Describes pain as constant but worse with movement and walking.  Is having some diarrhea as well; denies any rectal bleeding, hematochezia, melena. Has seen Dr. Ibane[facility] at [facility].  No previous abdominal surgeries.  Family history includes father with colon polyps, no family history of colorectal cancer or IBD.  Patient denies nausea, vomiting, fevers, chills, hematochezia, melena, unintentional weight changes.    Chief Complaint   Patient presents with    Abdominal Pain     Abdominal pain       Review of patient's allergies indicates:  No Known Allergies    Current Outpatient Medications   Medication Sig Dispense Refill    levETIRAcetam (KEPPRA) 750 MG Tab Take 2 tablets (1,500 mg total) by mouth 2 (two) times daily. 180 tablet 3    eslicarbazepine (APTIOM) 400 mg Tab Take 1 tablet (400 mg total) by mouth once daily. (Patient not taking: Reported on 1/22/2024) 90 tablet 3     No current facility-administered medications for this visit.       Past Medical History:   Diagnosis Date    Seizures      No past surgical history on file.  No family history on file.  Social History     Tobacco Use    Smoking status: Never     Passive exposure: Never   Substance Use Topics    Alcohol use: No     Comment: occasionally    Drug use: No        Review of Systems:  Review of Systems    Constitutional:  Negative for chills, fever and unexpected weight change.   HENT:  Negative for congestion.    Eyes:  Negative for visual disturbance.   Respiratory:  Negative for shortness of breath.    Cardiovascular:  Negative for chest pain.   Gastrointestinal:  Positive for abdominal pain and diarrhea. Negative for abdominal distention, anal bleeding, blood in stool, constipation, nausea, rectal pain and vomiting.   Genitourinary:  Negative for dysuria.   Musculoskeletal:  Negative for arthralgias.   Skin:  Negative for rash.   Neurological:  Negative for light-headedness.   Hematological:  Negative for adenopathy.       OBJECTIVE:     Vital Signs (Most Recent)  BP: 125/84 (01/22/24 1557)           Physical Exam:  Physical Exam  Vitals and nursing note reviewed.   Constitutional:       General: He is not in acute distress.     Appearance: Normal appearance. He is well-developed. He is obese. He is not ill-appearing or toxic-appearing.   HENT:      Head: Normocephalic and atraumatic.      Right Ear: External ear normal.      Left Ear: External ear normal.      Nose: Nose normal.   Cardiovascular:      Rate and Rhythm: Normal rate.   Pulmonary:      Effort: Pulmonary effort is normal. No respiratory distress.   Abdominal:      General: Abdomen is flat. There is no distension.      Palpations: Abdomen is soft.      Tenderness: There is no abdominal tenderness.      Comments: +TTP to LLQ/RLQ, non-distended, soft, no rebound or guarding. No previous abdominal incisions   Musculoskeletal:         General: Normal range of motion.      Cervical back: Normal range of motion and neck supple.   Skin:     General: Skin is warm and dry.   Neurological:      Mental Status: He is alert and oriented to person, place, and time.   Psychiatric:         Mood and Affect: Mood normal.         Behavior: Behavior normal.         Laboratory           Component Ref Range & Units 2 mo ago  (11/24/23) 2 mo ago  (11/14/23) 3 mo  ago  (10/11/23) 3 mo ago  (10/7/23) 3 mo ago  (10/6/23)   Lipase 13 - 78 U/L 43 42 R 49 29 R 332 High  R      CBC: Reviewed  CMP: Reviewed    Diagnostic Results:    US Abdomen 10/2023    CT A/P 10/6/2023 - The appendix is prominent. It measures approximately 10 mm in diameter. There is slight haziness of the margin which may indicate inflammation. The appendix extends medially from the cecum and the tip lies at the abdominal midline. Acute appendicitis must be considered.     CT A/P 10/31/23 - No liver lesion is noted. The gallbladder spleen pancreas and adrenal glands are unremarkable. The aorta tapers normally. No acute renal abnormality is noted.   The appendix is less prominent than on the prior exam. There is no surrounding inflammation or haziness.   No pelvic mass or fluid collection is noted.     CT A/P 11/2023    ASSESSMENT/PLAN:     28 y.o. male with abdominal pain of unexplained etiology    - release of information requested to Dr. Ibanez's office; discussed possibility of repeating CT vs MRI, possibly CT or MR enterography. May need Crohn's/IBD workup  - RTC PRN    Dante Grover MD  Colon and Rectal Surgery  [facility]

## 2024-02-08 ENCOUNTER — TELEPHONE (OUTPATIENT)
Dept: SURGERY | Facility: CLINIC | Age: 29
End: 2024-02-08
Payer: COMMERCIAL

## 2024-02-08 DIAGNOSIS — R10.30 LOWER ABDOMINAL PAIN: Primary | ICD-10-CM

## 2024-02-08 NOTE — TELEPHONE ENCOUNTER
[facility] for patient with appointment times and direct callback number.     ----- Message from Dante Grover MD sent at 2/8/2024  2:33 PM CST -----  Reviewed all his records. Lets get a CT Enterography before he sees me again in clinic. Order is in.    Thanks,  MDG

## 2024-02-19 ENCOUNTER — OFFICE VISIT (OUTPATIENT)
Dept: SURGERY | Facility: CLINIC | Age: 29
End: 2024-02-19
Payer: COMMERCIAL

## 2024-02-19 ENCOUNTER — HOSPITAL ENCOUNTER (OUTPATIENT)
Dept: RADIOLOGY | Facility: HOSPITAL | Age: 29
Discharge: HOME | End: 2024-02-19
Attending: COLON & RECTAL SURGERY
Payer: COMMERCIAL

## 2024-02-19 VITALS
TEMPERATURE: 98 F | OXYGEN SATURATION: 97 % | DIASTOLIC BLOOD PRESSURE: 90 MMHG | HEART RATE: 107 BPM | SYSTOLIC BLOOD PRESSURE: 127 MMHG

## 2024-02-19 DIAGNOSIS — R10.9 ABDOMINAL PAIN, UNSPECIFIED ABDOMINAL LOCATION: Primary | ICD-10-CM

## 2024-02-19 DIAGNOSIS — R10.30 LOWER ABDOMINAL PAIN: ICD-10-CM

## 2024-02-19 PROCEDURE — 99499 UNLISTED E&M SERVICE: CPT | Mod: S$GLB,,, | Performed by: COLON & RECTAL SURGERY

## 2024-02-19 PROCEDURE — 25500020 PHARM REV CODE 255: Performed by: COLON & RECTAL SURGERY

## 2024-02-19 PROCEDURE — 74177 CT ABD & PELVIS W/CONTRAST: CPT | Mod: TC

## 2024-02-19 PROCEDURE — 74177 CT ABD & PELVIS W/CONTRAST: CPT | Mod: 26,,, | Performed by: RADIOLOGY

## 2024-02-19 RX ADMIN — IOHEXOL 100 ML: 350 INJECTION, SOLUTION INTRAVENOUS at 03:02

## 2024-02-21 ENCOUNTER — OFFICE VISIT (OUTPATIENT)
Dept: SURGERY | Facility: CLINIC | Age: 29
End: 2024-02-21
Payer: COMMERCIAL

## 2024-02-21 VITALS
BODY MASS INDEX: 37.37 KG/M2 | WEIGHT: 261 LBS | SYSTOLIC BLOOD PRESSURE: 137 MMHG | HEART RATE: 94 BPM | DIASTOLIC BLOOD PRESSURE: 106 MMHG | HEIGHT: 70 IN | TEMPERATURE: 98 F | OXYGEN SATURATION: 96 %

## 2024-02-21 DIAGNOSIS — R10.9 ABDOMINAL PAIN, UNSPECIFIED ABDOMINAL LOCATION: Primary | ICD-10-CM

## 2024-02-21 PROCEDURE — 99214 OFFICE O/P EST MOD 30 MIN: CPT | Mod: S$GLB,,, | Performed by: COLON & RECTAL SURGERY

## 2024-02-21 PROCEDURE — 1159F MED LIST DOCD IN RCRD: CPT | Mod: CPTII,S$GLB,, | Performed by: COLON & RECTAL SURGERY

## 2024-02-21 PROCEDURE — 3075F SYST BP GE 130 - 139MM HG: CPT | Mod: CPTII,S$GLB,, | Performed by: COLON & RECTAL SURGERY

## 2024-02-21 PROCEDURE — 3008F BODY MASS INDEX DOCD: CPT | Mod: CPTII,S$GLB,, | Performed by: COLON & RECTAL SURGERY

## 2024-02-21 PROCEDURE — 99999 PR PBB SHADOW E&M-EST. PATIENT-LVL III: CPT | Mod: PBBFAC,,, | Performed by: COLON & RECTAL SURGERY

## 2024-02-21 PROCEDURE — 3080F DIAST BP >= 90 MM HG: CPT | Mod: CPTII,S$GLB,, | Performed by: COLON & RECTAL SURGERY

## 2024-02-21 NOTE — PROGRESS NOTES
History & Physical    SUBJECTIVE:     CC: abdominal pain     History of Present Illness:  Patient is a 28 y.o. male presents with abdominal pain onset of symptoms October 2023.  Patient describes left lower quadrant abdominal pain that radiates to right lower quadrant.  Presented to [facility] in October and again in November and was found to have elevated lipase during initial workup concerning for pancreatitis, but normal levels since.  He received several abdominal CTs, abdominal ultrasound, a colonoscopy and EGD that have all been unrevealing as the etiology of the pain.  Negative stool samples.  Colonoscopy with biopsy negative; some diverticulosis.  Describes pain as constant but worse with movement and walking.  Is having some diarrhea as well; denies any rectal bleeding, hematochezia, melena. Has seen Dr. Ibanez at [facility].  No previous abdominal surgeries.  Family history includes father with colon polyps, no family history of colorectal cancer or IBD.  Patient denies nausea, vomiting, fevers, chills, hematochezia, melena, unintentional weight changes.    Interval history:  Since last clinic visit, we have received and reviewed the outside records from Dr. Ibanez's office at [facility].  Have also had a repeat CT scan done with CT enterography completed earlier this week showing no evidence of small bowel or colonic inflammation and normal-appearing appendix.  Patient continues to have the abdominal pain that is mostly periumbilical in nature.  Endorses ongoing diarrhea multiple times per day.  Endorses 1 episode of fecal incontinence last week.  Denies any hematochezia or melena.    Review of patient's allergies indicates:   Allergen Reactions    Cat/feline products Itching       Current Outpatient Medications   Medication Sig Dispense Refill    eslicarbazepine (APTIOM) 400 mg Tab Take 1 tablet (400 mg total) by mouth once daily. (Patient not taking: Reported on 1/22/2024) 90 tablet 3    levETIRAcetam (KEPPRA)  "750 MG Tab Take 2 tablets (1,500 mg total) by mouth 2 (two) times daily. 180 tablet 3     No current facility-administered medications for this visit.       Past Medical History:   Diagnosis Date    Seizures      No past surgical history on file.  No family history on file.  Social History     Tobacco Use    Smoking status: Never     Passive exposure: Never   Substance Use Topics    Alcohol use: No     Comment: occasionally    Drug use: No        Review of Systems:  Review of Systems   Constitutional:  Negative for chills, fever and unexpected weight change.   HENT:  Negative for congestion.    Eyes:  Negative for visual disturbance.   Respiratory:  Negative for shortness of breath.    Cardiovascular:  Negative for chest pain.   Gastrointestinal:  Positive for abdominal pain and diarrhea. Negative for abdominal distention, anal bleeding, blood in stool, constipation, nausea, rectal pain and vomiting.   Genitourinary:  Negative for dysuria.   Musculoskeletal:  Negative for arthralgias.   Skin:  Negative for rash.   Neurological:  Negative for light-headedness.   Hematological:  Negative for adenopathy.       OBJECTIVE:     Vital Signs (Most Recent)  Temp: 98 °F (36.7 °C) (02/21/24 1334)  Pulse: 94 (02/21/24 1334)  BP: (!) 137/106 (02/21/24 1334)  SpO2: 96 % (02/21/24 1334)  5' 10" (1.778 m)  118.4 kg (261 lb)     Physical Exam:  Physical Exam  Vitals and nursing note reviewed.   Constitutional:       General: He is not in acute distress.     Appearance: Normal appearance. He is well-developed. He is obese. He is not ill-appearing or toxic-appearing.   HENT:      Head: Normocephalic and atraumatic.      Right Ear: External ear normal.      Left Ear: External ear normal.      Nose: Nose normal.   Cardiovascular:      Rate and Rhythm: Normal rate.   Pulmonary:      Effort: Pulmonary effort is normal. No respiratory distress.   Abdominal:      General: Abdomen is flat. There is no distension.      Palpations: Abdomen is " soft.      Tenderness: There is no abdominal tenderness.      Comments: +TTP to periumbilical, non-distended, soft, no rebound or guarding. No previous abdominal incisions; no TTP in LLQ or RLQ currently   Musculoskeletal:         General: Normal range of motion.      Cervical back: Normal range of motion and neck supple.   Skin:     General: Skin is warm and dry.   Neurological:      Mental Status: He is alert and oriented to person, place, and time.   Psychiatric:         Mood and Affect: Mood normal.         Behavior: Behavior normal.         Laboratory           Component Ref Range & Units 2 mo ago  (11/24/23) 2 mo ago  (11/14/23) 3 mo ago  (10/11/23) 3 mo ago  (10/7/23) 3 mo ago  (10/6/23)   Lipase 13 - 78 U/L 43 42 R 49 29 R 332 High  R      CBC: Reviewed  CMP: Reviewed    Diagnostic Results:    US Abdomen 10/2023    CT A/P 10/6/2023 - The appendix is prominent. It measures approximately 10 mm in diameter. There is slight haziness of the margin which may indicate inflammation. The appendix extends medially from the cecum and the tip lies at the abdominal midline. Acute appendicitis must be considered.     CT A/P 10/31/23 - No liver lesion is noted. The gallbladder spleen pancreas and adrenal glands are unremarkable. The aorta tapers normally. No acute renal abnormality is noted.   The appendix is less prominent than on the prior exam. There is no surrounding inflammation or haziness.   No pelvic mass or fluid collection is noted.     CT Enterography Feb 2024:  FINDINGS:  Heart: Normal in size. No pericardial effusion.     Lung Bases: Well aerated, without consolidation or pleural fluid.     Liver: Normal in size and attenuation, with no focal hepatic lesions.     Gallbladder: No calcified gallstones.     Bile Ducts: No evidence of dilated ducts.     Pancreas: No mass or peripancreatic fat stranding.     Spleen: Unremarkable.     Adrenals: Unremarkable.     Kidneys/ Ureters: Unremarkable.     Bladder: No  evidence of wall thickening.     Reproductive organs: Unremarkable.     GI Tract/Mesentery: No evidence of bowel obstruction or inflammation.  No evidence of inflammatory bowel disease. Normal appearance of the stomach, duodenum, small bowel, terminal ileum, ileocecal valve, colon, and rectum. No fistula, fluid collection, free air, obstruction, fat stranding, or free fluid.     Appendix: No evidence of appendictis.     Peritoneal Space: No ascites. No free air.     Retroperitoneum: No significant adenopathy.     Abdominal wall: Unremarkable.     Vasculature: No significant atherosclerosis or aneurysm.     Bones: No acute fracture.     Impression:     No acute abnormality.  No evidence of inflammatory bowel disease. Normal appearance of the terminal ileum, ileocecal valve, colon, and rectum.    ASSESSMENT/PLAN:     28 y.o. male with abdominal pain of unexplained etiology    - Long discussion with the patient regarding his multiple CT scans including a CT enterography this week showing no evidence of inflammation or cause of his abdominal pain.  Discussed that there is nothing surgical to intervene on it this time.  Discussed that any surgical intervention would likely set back his course and he likely needs further evaluation for medical management of possible IBS or any other medically manage diseases  - Will have him follow up with Dr. Ibanez at [facility]   - [facility] MINI Grover MD  Colon and Rectal Surgery  [facility]